# Patient Record
Sex: FEMALE | Race: WHITE | NOT HISPANIC OR LATINO
[De-identification: names, ages, dates, MRNs, and addresses within clinical notes are randomized per-mention and may not be internally consistent; named-entity substitution may affect disease eponyms.]

---

## 2017-03-27 ENCOUNTER — APPOINTMENT (OUTPATIENT)
Dept: CARDIOTHORACIC SURGERY | Facility: CLINIC | Age: 82
End: 2017-03-27

## 2017-03-27 ENCOUNTER — INPATIENT (INPATIENT)
Facility: HOSPITAL | Age: 82
LOS: 2 days | Discharge: ANOTHER HOSPITAL | DRG: 306 | End: 2017-03-30
Attending: INTERNAL MEDICINE | Admitting: INTERNAL MEDICINE
Payer: MEDICARE

## 2017-03-27 VITALS
HEIGHT: 59 IN | DIASTOLIC BLOOD PRESSURE: 68 MMHG | RESPIRATION RATE: 20 BRPM | OXYGEN SATURATION: 96 % | SYSTOLIC BLOOD PRESSURE: 108 MMHG | WEIGHT: 124.78 LBS | HEART RATE: 112 BPM

## 2017-03-27 VITALS
SYSTOLIC BLOOD PRESSURE: 139 MMHG | OXYGEN SATURATION: 98 % | TEMPERATURE: 97.3 F | HEART RATE: 113 BPM | RESPIRATION RATE: 19 BRPM | DIASTOLIC BLOOD PRESSURE: 74 MMHG

## 2017-03-27 DIAGNOSIS — I50.22 CHRONIC SYSTOLIC (CONGESTIVE) HEART FAILURE: ICD-10-CM

## 2017-03-27 DIAGNOSIS — I35.0 NONRHEUMATIC AORTIC (VALVE) STENOSIS: ICD-10-CM

## 2017-03-27 DIAGNOSIS — Z86.79 PERSONAL HISTORY OF OTHER DISEASES OF THE CIRCULATORY SYSTEM: ICD-10-CM

## 2017-03-27 DIAGNOSIS — I50.41 ACUTE COMBINED SYSTOLIC (CONGESTIVE) AND DIASTOLIC (CONGESTIVE) HEART FAILURE: ICD-10-CM

## 2017-03-27 DIAGNOSIS — Z86.39 PERSONAL HISTORY OF OTHER ENDOCRINE, NUTRITIONAL AND METABOLIC DISEASE: ICD-10-CM

## 2017-03-27 DIAGNOSIS — Z85.3 PERSONAL HISTORY OF MALIGNANT NEOPLASM OF BREAST: ICD-10-CM

## 2017-03-27 DIAGNOSIS — Z78.9 OTHER SPECIFIED HEALTH STATUS: ICD-10-CM

## 2017-03-27 DIAGNOSIS — E11.9 TYPE 2 DIABETES MELLITUS WITHOUT COMPLICATIONS: ICD-10-CM

## 2017-03-27 PROBLEM — Z00.00 ENCOUNTER FOR PREVENTIVE HEALTH EXAMINATION: Status: ACTIVE | Noted: 2017-03-27

## 2017-03-27 LAB
ALBUMIN SERPL ELPH-MCNC: 3.5 G/DL — SIGNIFICANT CHANGE UP (ref 3.4–5)
ALP SERPL-CCNC: 147 U/L — HIGH (ref 40–120)
ALT FLD-CCNC: 78 U/L — HIGH (ref 12–42)
ANION GAP SERPL CALC-SCNC: 11 MMOL/L — SIGNIFICANT CHANGE UP (ref 9–16)
APTT BLD: 29.1 SEC — SIGNIFICANT CHANGE UP (ref 27.5–37.4)
AST SERPL-CCNC: 43 U/L — HIGH (ref 15–37)
BASOPHILS NFR BLD AUTO: 0.2 % — SIGNIFICANT CHANGE UP (ref 0–2)
BILIRUB SERPL-MCNC: 0.8 MG/DL — SIGNIFICANT CHANGE UP (ref 0.2–1.2)
BLD GP AB SCN SERPL QL: POSITIVE — SIGNIFICANT CHANGE UP
BUN SERPL-MCNC: 81 MG/DL — HIGH (ref 7–23)
CALCIUM SERPL-MCNC: 9.8 MG/DL — SIGNIFICANT CHANGE UP (ref 8.5–10.5)
CHLORIDE SERPL-SCNC: 100 MMOL/L — SIGNIFICANT CHANGE UP (ref 96–108)
CO2 SERPL-SCNC: 22 MMOL/L — SIGNIFICANT CHANGE UP (ref 22–31)
CREAT SERPL-MCNC: 1.12 MG/DL — SIGNIFICANT CHANGE UP (ref 0.5–1.3)
EOSINOPHIL NFR BLD AUTO: 0.2 % — SIGNIFICANT CHANGE UP (ref 0–6)
GLUCOSE SERPL-MCNC: 218 MG/DL — HIGH (ref 70–99)
HBA1C BLD-MCNC: 7.7 % — HIGH (ref 4.8–5.6)
HCT VFR BLD CALC: 35.4 % — SIGNIFICANT CHANGE UP (ref 34.5–45)
HGB BLD-MCNC: 11.7 G/DL — SIGNIFICANT CHANGE UP (ref 11.5–15.5)
INR BLD: 1.21 — HIGH (ref 0.88–1.16)
LYMPHOCYTES # BLD AUTO: 13.9 % — SIGNIFICANT CHANGE UP (ref 13–44)
MCHC RBC-ENTMCNC: 29.5 PG — SIGNIFICANT CHANGE UP (ref 27–34)
MCHC RBC-ENTMCNC: 33.1 G/DL — SIGNIFICANT CHANGE UP (ref 32–36)
MCV RBC AUTO: 89.2 FL — SIGNIFICANT CHANGE UP (ref 80–100)
MONOCYTES NFR BLD AUTO: 8.9 % — SIGNIFICANT CHANGE UP (ref 2–14)
NEUTROPHILS NFR BLD AUTO: 76.8 % — SIGNIFICANT CHANGE UP (ref 43–77)
NT-PROBNP SERPL-SCNC: HIGH PG/ML
PLATELET # BLD AUTO: 106 K/UL — LOW (ref 150–400)
POTASSIUM SERPL-MCNC: 4.8 MMOL/L — SIGNIFICANT CHANGE UP (ref 3.5–5.3)
POTASSIUM SERPL-SCNC: 4.8 MMOL/L — SIGNIFICANT CHANGE UP (ref 3.5–5.3)
PROT SERPL-MCNC: 6.9 G/DL — SIGNIFICANT CHANGE UP (ref 6.4–8.2)
PROTHROM AB SERPL-ACNC: 13.5 SEC — HIGH (ref 9.8–12.7)
RBC # BLD: 3.97 M/UL — SIGNIFICANT CHANGE UP (ref 3.8–5.2)
RBC # FLD: 14 % — SIGNIFICANT CHANGE UP (ref 10.3–16.9)
RH IG SCN BLD-IMP: POSITIVE — SIGNIFICANT CHANGE UP
SODIUM SERPL-SCNC: 133 MMOL/L — LOW (ref 135–145)
TSH SERPL-MCNC: 1.96 UIU/ML — SIGNIFICANT CHANGE UP (ref 0.35–4.94)
WBC # BLD: 4.8 K/UL — SIGNIFICANT CHANGE UP (ref 3.8–10.5)
WBC # FLD AUTO: 4.8 K/UL — SIGNIFICANT CHANGE UP (ref 3.8–10.5)

## 2017-03-27 PROCEDURE — 93010 ELECTROCARDIOGRAM REPORT: CPT

## 2017-03-27 PROCEDURE — 86077 PHYS BLOOD BANK SERV XMATCH: CPT

## 2017-03-27 PROCEDURE — 71010: CPT | Mod: 26

## 2017-03-27 RX ORDER — DEXTROSE 50 % IN WATER 50 %
25 SYRINGE (ML) INTRAVENOUS ONCE
Qty: 0 | Refills: 0 | Status: DISCONTINUED | OUTPATIENT
Start: 2017-03-27 | End: 2017-03-30

## 2017-03-27 RX ORDER — DEXTROSE 50 % IN WATER 50 %
1 SYRINGE (ML) INTRAVENOUS ONCE
Qty: 0 | Refills: 0 | Status: DISCONTINUED | OUTPATIENT
Start: 2017-03-27 | End: 2017-03-30

## 2017-03-27 RX ORDER — SODIUM CHLORIDE 9 MG/ML
3 INJECTION INTRAMUSCULAR; INTRAVENOUS; SUBCUTANEOUS EVERY 8 HOURS
Qty: 0 | Refills: 0 | Status: DISCONTINUED | OUTPATIENT
Start: 2017-03-27 | End: 2017-03-30

## 2017-03-27 RX ORDER — GLUCAGON INJECTION, SOLUTION 0.5 MG/.1ML
1 INJECTION, SOLUTION SUBCUTANEOUS ONCE
Qty: 0 | Refills: 0 | Status: DISCONTINUED | OUTPATIENT
Start: 2017-03-27 | End: 2017-03-30

## 2017-03-27 RX ORDER — CHLORHEXIDINE GLUCONATE 213 G/1000ML
10 SOLUTION TOPICAL ONCE
Qty: 0 | Refills: 0 | Status: COMPLETED | OUTPATIENT
Start: 2017-03-27 | End: 2017-03-28

## 2017-03-27 RX ORDER — FUROSEMIDE 40 MG
20 TABLET ORAL ONCE
Qty: 0 | Refills: 0 | Status: COMPLETED | OUTPATIENT
Start: 2017-03-27 | End: 2017-03-27

## 2017-03-27 RX ORDER — SODIUM CHLORIDE 9 MG/ML
1000 INJECTION, SOLUTION INTRAVENOUS
Qty: 0 | Refills: 0 | Status: DISCONTINUED | OUTPATIENT
Start: 2017-03-27 | End: 2017-03-30

## 2017-03-27 RX ORDER — CHLORHEXIDINE GLUCONATE 213 G/1000ML
1 SOLUTION TOPICAL ONCE
Qty: 0 | Refills: 0 | Status: COMPLETED | OUTPATIENT
Start: 2017-03-28 | End: 2017-03-28

## 2017-03-27 RX ORDER — INSULIN LISPRO 100/ML
VIAL (ML) SUBCUTANEOUS
Qty: 0 | Refills: 0 | Status: DISCONTINUED | OUTPATIENT
Start: 2017-03-27 | End: 2017-03-30

## 2017-03-27 RX ORDER — DEXTROSE 50 % IN WATER 50 %
12.5 SYRINGE (ML) INTRAVENOUS ONCE
Qty: 0 | Refills: 0 | Status: DISCONTINUED | OUTPATIENT
Start: 2017-03-27 | End: 2017-03-30

## 2017-03-27 RX ADMIN — SODIUM CHLORIDE 3 MILLILITER(S): 9 INJECTION INTRAMUSCULAR; INTRAVENOUS; SUBCUTANEOUS at 21:47

## 2017-03-27 RX ADMIN — Medication 20 MILLIGRAM(S): at 21:43

## 2017-03-27 RX ADMIN — Medication 4: at 21:43

## 2017-03-27 NOTE — H&P ADULT - NSHPLABSRESULTS_GEN_ALL_CORE
***PRELIMINARY REPORT***    EXAM:  XR CHEST-FRONTAL                          PROCEDURE DATE:  03/27/2017        ***PRELIMINARY REPORT***      INTERPRETATION:  RESIDENT PRELIMINARY REPORT    XR CHEST-FRONTAL dated 3/27/2017 7:34 PM.    INDICATION: 89-year-old female with shortness of breath and congestive   heart failure.    COMPARISON: Chest x-ray 12/1/2014    FINDINGS:  Single AP view of the chest is submitted. There has been interval   placement of a left chest dual-lead permanent pacemaker. The heart is   enlarged. Slightly ectatic course of the thoracic aorta is noted. Small   right pleural effusion with adjacent basilar atelectasis. No left   effusion. No pneumothorax. Osseous structures demonstrate degenerative   changes.      IMPRESSION:  Cardiomegaly with small right pleural effusion and adjacent right basilar   atelectasis.

## 2017-03-27 NOTE — H&P ADULT - ASSESSMENT
89y F with history of HTN, DM2 (diet controlled), systolic and diastolic CHF, recently treated for PNA at OSH and discharged to home after medical therapy.  Pt returned to her cardiologist and underwent echocardiogram which showed critical AS with mean pressure gradient of 44mmHg and BONNIE 0.36cm2.  Pt was referred for further evaluation by Dr. Patel and was found to be in acute CHF exacerbation at the office visit and was subsequently admitted for medical optimization and further procedural workup for AS. 89y F with history of HTN, DM2 (diet controlled), systolic and diastolic CHF, recently treated for PNA at OSH and discharged to home after medical therapy.  Pt returned to her cardiologist and underwent echocardiogram which showed critical AS with mean pressure gradient of 44mmHg and BONNIE 0.36cm2.  Pt was referred for further evaluation by Dr. Patel and was found to be in acute CHF exacerbation at the office visit and was subsequently admitted for medical optimization and further procedural workup for AS.    acute/chronic diastolic ?systolic CHF (pending TTE for EF and AI), critical AS, NYHA IV

## 2017-03-27 NOTE — H&P ADULT - HISTORY OF PRESENT ILLNESS
Mrs. Wang is a 89y F with history of HTN, DM2 (diet controlled), Breast CA s/p lumpectomy 22 years ago, diastolic and systolic CHF who was recently admitted to [facility] for pneumonia.  Pt received antibiotic therapy and was discharged to home on day 2 of hospital stay.  Pt later was evaluated as an outpatient by her cardiologist (Dr. Hand) and underwent echocardiogram which revealed critical aortic stenosis with an BONNIE 0.36cm and mean pressure gradient of 44mmHg.  Pt was referred to [facility] for evaluation of aortic stenosis, for which she was seen as an outpatient on 3/27/17.  At the office visit, pt was found to be fluid overloaded with acute SOB and pt was admitted to [facility] under the care of Dr. Patel for CHF medical optimization and procedural evaluation for possible BAV.  At the time of exam, pt currently endorses ____ and denies ____. Mrs. Wang is a 89y F with history of HTN, DM2 (diet controlled), Breast CA s/p lumpectomy 22 years ago, diastolic and systolic CHF who was recently admitted to [facility] for pneumonia.  Pt received antibiotic therapy and was discharged to home on day 2 of hospital stay.  Pt later was evaluated as an outpatient by her cardiologist (Dr. Hand) and underwent echocardiogram which revealed critical aortic stenosis with an BONNIE 0.36cm and mean pressure gradient of 44mmHg.  Pt was referred to Dr. Patel (Structural Heart) for evaluation of aortic stenosis, for which she presented as an outpatient on 3/27/17.  At the office visit, pt was found to be fluid overloaded with acute SOB and b/l LE pitting edema and NYHA Class IV symptoms including increasing fatigue that progressed to the point where pt was non-ambulatory and required to be mobilized with a wheelchair.  Pt was admitted on 3/27/17 to [facility] under the care of Dr. Patel for CHF medical optimization and procedural evaluation for possible BAV.  At the time of exam, pt currently endorses increased fatigue x 1 week, SOB and productive cough as well as the feeling that her heart is "beating hard".  Currently denies HA, syncope, AMS, substernal CP, WAGNER, PND, wheeze, hemoptysis, n/v/d/c, fever, chills, night sweats.

## 2017-03-27 NOTE — H&P ADULT - PMH
Aortic valve disorder  Aortic stenosis, moderate  Cellulitis and abscess of foot  Cellulitis of foot, right  Osteoarthritis  OA (osteoarthritis)  Type 2 diabetes mellitus  DM (diabetes mellitus)

## 2017-03-27 NOTE — H&P ADULT - NSHPPHYSICALEXAM_GEN_ALL_CORE
CONSTITUTIONAL:                                                                          WNL  NEURO:                                                                                             WNL                      EYES:                                                                                                  WNL  ENMT:                                                                                                WNL  CV:                                                                                                      WNL  RESPIRATORY:                                                                                  WNL  GI:                                                                                                       WNL  : CROCKER + / -                                                                                 WNL  MUSKULOSKELETAL:                                                                       WNL  SKIN / BREAST:                                                                                 WNL CONSTITUTIONAL:   Appears in no acute distress, of stated age, well nourished.   NEURO: AAOx3, no focal deficits, no AMS, answers questions appropriately.                       EYES: WNL  ENMT:   WNL  CV:  Tachycardic, S1/S2, no m/r/g.   RESPIRATORY: Diffuse rales in b/l lung fields, no acute distress.  No wheeze of rhonchi.  GI: ND, soft, NBS, non-TTP.  :  WNL  MUSKULOSKELETAL: WNL  SKIN / BREAST:   WNL  EXTREMITIES: 3+ b/l LE Pitting edema.    VASCULAR: R: 1+ b/l; unable to palpate DP and PT 2/2 edema.

## 2017-03-27 NOTE — H&P ADULT - PROBLEM SELECTOR PLAN 2
-Lasix 20mg PO x 1 dose per Dr. Patel  -Nasal cannula, humidified O2 at 2L  -Monitor respiratory status via SpO2  -Oro inserted on admission, strict monitoring of I/O and daily weights.   -DASH/TLC, constant carb diet with fluid restrictions (1L)  -CXR on admission shows increased congestion and L pleural effusion.  Repeat CXR in AM  -PT to be consulted tomorrow to maintain physical condition pre and post-procedure.

## 2017-03-27 NOTE — H&P ADULT - NSHPSOCIALHISTORY_GEN_ALL_CORE
SOCIAL HISTORY:  Smoker:  YES / NO        PACK YEARS:                         WHEN QUIT?  ETOH use:  YES / NO               FREQUENCY / QUANTITY:  Ilicit Drug use:  YES / NO  Occupation:  Assisted device use (Cane / Walker):  Live with: SOCIAL HISTORY:  Smoker:  NO         ETOH use:   NO                 Ilicit Drug use: NO  Occupation: Retired  Assisted device use (Cane / Walker): Wheelchair  Live with: Alone (Daughter lives downstairs)

## 2017-03-27 NOTE — H&P ADULT - NSHPREVIEWOFSYSTEMS_GEN_ALL_CORE
Review of Systems  CONSTITUTIONAL:  Denies Fevers / chills, sweats, fatigue, weight loss, weight gain                                      NEURO:  Denies parathesias, seizures, syncope, confusion                                                                                EYES:  Denies Blurry vision, discharge, pain, loss of vision                                                                                    ENMT:  Denies Difficulty hearing, vertigo, dysphagia, epistaxis, recent dental work                                       CV:  Denies Chest pain, palpitations, WAGNER, orthopnea                                                                                          RESPIRATORY:  Denies Wheezing, SOB, cough / sputum, hemoptysis                                                                GI:  Denies Nausea, vommiting, diarrhea, constipation, melena, difficulty swallowing                                               : Denies Hematuria, dysuria, urgency, incontinence                                                                                         MUSKULOSKELETAL:  Denies arthritis, joint swelling, muscle weakness                                                             SKIN/BREAST:  Denies rash, itching, joann loss, masses                                                                                            PSYCH:  Denies depresion, anxiety, suicidal ideation                                                                                               HEME/LYMPH:  Denies bruises easily, enlarged lymph nodes, tender lymph nodes                                        ENDOCRINE:  Denies cold intolerance, heat intolerance, polydipsia Review of Systems  CONSTITUTIONAL:  +Fatigue x 1 weeks.  Denies Fevers / chills, sweats, weight loss                                      NEURO:  Denies parathesias, seizures, syncope, confusion                                                                                EYES:  Denies Blurry vision, discharge, pain, loss of vision                                                                                    ENMT:  Denies Difficulty hearing, vertigo, dysphagia, epistaxis, recent dental work                                       CV:  +Palpitations ("beating hard"), Denies Chest pain, WAGNER, orthopnea                                                                                          RESPIRATORY: +SOB and productive cough with clear sputum; Denies Wheezing, hemoptysis                                                                GI:  Denies Nausea, vommiting, diarrhea, constipation, melena, difficulty swallowing                                               : Denies Hematuria, dysuria, urgency, incontinence                                                                                         MUSKULOSKELETAL:  Denies arthritis, joint swelling, muscle weakness                                                             SKIN/BREAST:  Denies rash, itching, joann loss, masses                                                                                            PSYCH:  Denies depresion, anxiety, suicidal ideation                                                                                               HEME/LYMPH:  Denies bruises easily, enlarged lymph nodes, tender lymph nodes                                        ENDOCRINE:  Denies cold intolerance, heat intolerance, polydipsia

## 2017-03-27 NOTE — H&P ADULT - PROBLEM SELECTOR PLAN 1
-NPO at [facility] for planned BAV/Cardiac cath tomorrow.    -Echocardiogram, Carotid dopplers, PFTs ordered, pending  -CXR and EKG performed, f/u results.   -Pre-op labs: CBC, CMP, Coags, T/Sx2, UA, TFT, pro-BNP, A1C ordered, pending.   -Monitor HR, BP, Tele  -Will maintain caution with Beta Blockade 2/2 critical AS  -Medical optimization of CHF -NPO at [facility] for planned BAV/Cardiac cath tomorrow - will need TTE prior  -TTE, Carotid dopplers, PFTs ordered, pending  -CXR and EKG performed, f/u results.   -Pre-op labs: CBC, CMP, Coags, T/Sx2, UA, TFT, pro-BNP, A1C ordered, pending.   -Monitor HR, BP, Tele  -Will maintain caution with Beta Blockade 2/2 critical AS  -Medical optimization of CHF

## 2017-03-27 NOTE — H&P ADULT - PSH
Other postprocedural status  S/P appendectomy  Other postprocedural status  S/P lumpectomy of breast  Other postprocedural status  calcified granulma

## 2017-03-28 ENCOUNTER — APPOINTMENT (OUTPATIENT)
Dept: CARDIOTHORACIC SURGERY | Facility: HOSPITAL | Age: 82
End: 2017-03-28

## 2017-03-28 PROBLEM — Z78.9 NON-SMOKER: Status: ACTIVE | Noted: 2017-03-28

## 2017-03-28 LAB
ALBUMIN SERPL ELPH-MCNC: 3 G/DL — LOW (ref 3.4–5)
ALP SERPL-CCNC: 119 U/L — SIGNIFICANT CHANGE UP (ref 40–120)
ALT FLD-CCNC: 66 U/L — HIGH (ref 12–42)
ANION GAP SERPL CALC-SCNC: 10 MMOL/L — SIGNIFICANT CHANGE UP (ref 9–16)
APPEARANCE UR: CLEAR — SIGNIFICANT CHANGE UP
AST SERPL-CCNC: 38 U/L — HIGH (ref 15–37)
BACTERIA # UR AUTO: PRESENT /HPF
BASOPHILS NFR BLD AUTO: 0.4 % — SIGNIFICANT CHANGE UP (ref 0–2)
BILIRUB SERPL-MCNC: 0.8 MG/DL — SIGNIFICANT CHANGE UP (ref 0.2–1.2)
BILIRUB UR-MCNC: NEGATIVE — SIGNIFICANT CHANGE UP
BUN SERPL-MCNC: 85 MG/DL — HIGH (ref 7–23)
CALCIUM SERPL-MCNC: 9.6 MG/DL — SIGNIFICANT CHANGE UP (ref 8.5–10.5)
CHLORIDE SERPL-SCNC: 105 MMOL/L — SIGNIFICANT CHANGE UP (ref 96–108)
CO2 SERPL-SCNC: 23 MMOL/L — SIGNIFICANT CHANGE UP (ref 22–31)
COLOR SPEC: YELLOW — SIGNIFICANT CHANGE UP
CREAT SERPL-MCNC: 1.09 MG/DL — SIGNIFICANT CHANGE UP (ref 0.5–1.3)
DIFF PNL FLD: (no result)
EOSINOPHIL NFR BLD AUTO: 0.6 % — SIGNIFICANT CHANGE UP (ref 0–6)
EPI CELLS # UR: SIGNIFICANT CHANGE UP /HPF
GLUCOSE SERPL-MCNC: 130 MG/DL — HIGH (ref 70–99)
GLUCOSE UR QL: NEGATIVE — SIGNIFICANT CHANGE UP
HCT VFR BLD CALC: 32.5 % — LOW (ref 34.5–45)
HGB BLD-MCNC: 10.6 G/DL — LOW (ref 11.5–15.5)
KETONES UR-MCNC: (no result) MG/DL
LEUKOCYTE ESTERASE UR-ACNC: (no result)
LYMPHOCYTES # BLD AUTO: 20.9 % — SIGNIFICANT CHANGE UP (ref 13–44)
MCHC RBC-ENTMCNC: 29 PG — SIGNIFICANT CHANGE UP (ref 27–34)
MCHC RBC-ENTMCNC: 32.6 G/DL — SIGNIFICANT CHANGE UP (ref 32–36)
MCV RBC AUTO: 89 FL — SIGNIFICANT CHANGE UP (ref 80–100)
MONOCYTES NFR BLD AUTO: 11.5 % — SIGNIFICANT CHANGE UP (ref 2–14)
NEUTROPHILS NFR BLD AUTO: 66.6 % — SIGNIFICANT CHANGE UP (ref 43–77)
NITRITE UR-MCNC: NEGATIVE — SIGNIFICANT CHANGE UP
PH UR: 5 — SIGNIFICANT CHANGE UP (ref 4–8)
PLATELET # BLD AUTO: 111 K/UL — LOW (ref 150–400)
POTASSIUM SERPL-MCNC: 4.5 MMOL/L — SIGNIFICANT CHANGE UP (ref 3.5–5.3)
POTASSIUM SERPL-SCNC: 4.5 MMOL/L — SIGNIFICANT CHANGE UP (ref 3.5–5.3)
PROT SERPL-MCNC: 6 G/DL — LOW (ref 6.4–8.2)
PROT UR-MCNC: 30 MG/DL
RBC # BLD: 3.65 M/UL — LOW (ref 3.8–5.2)
RBC # FLD: 14.2 % — SIGNIFICANT CHANGE UP (ref 10.3–16.9)
RBC CASTS # UR COMP ASSIST: > 10 /HPF
RH IG SCN BLD-IMP: POSITIVE — SIGNIFICANT CHANGE UP
SODIUM SERPL-SCNC: 138 MMOL/L — SIGNIFICANT CHANGE UP (ref 135–145)
SP GR SPEC: 1.02 — SIGNIFICANT CHANGE UP (ref 1–1.03)
UROBILINOGEN FLD QL: 0.2 E.U./DL — SIGNIFICANT CHANGE UP
WBC # BLD: 4.9 K/UL — SIGNIFICANT CHANGE UP (ref 3.8–10.5)
WBC # FLD AUTO: 4.9 K/UL — SIGNIFICANT CHANGE UP (ref 3.8–10.5)
WBC UR QL: (no result) /HPF

## 2017-03-28 PROCEDURE — 99223 1ST HOSP IP/OBS HIGH 75: CPT

## 2017-03-28 PROCEDURE — 93306 TTE W/DOPPLER COMPLETE: CPT | Mod: 26

## 2017-03-28 PROCEDURE — 71010: CPT | Mod: 26

## 2017-03-28 PROCEDURE — 93880 EXTRACRANIAL BILAT STUDY: CPT | Mod: 26

## 2017-03-28 PROCEDURE — 99222 1ST HOSP IP/OBS MODERATE 55: CPT

## 2017-03-28 PROCEDURE — 94010 BREATHING CAPACITY TEST: CPT | Mod: 26

## 2017-03-28 PROCEDURE — 99233 SBSQ HOSP IP/OBS HIGH 50: CPT

## 2017-03-28 RX ORDER — PANTOPRAZOLE SODIUM 20 MG/1
40 TABLET, DELAYED RELEASE ORAL
Qty: 0 | Refills: 0 | Status: DISCONTINUED | OUTPATIENT
Start: 2017-03-28 | End: 2017-03-30

## 2017-03-28 RX ORDER — HEPARIN SODIUM 5000 [USP'U]/ML
5000 INJECTION INTRAVENOUS; SUBCUTANEOUS EVERY 8 HOURS
Qty: 0 | Refills: 0 | Status: DISCONTINUED | OUTPATIENT
Start: 2017-03-28 | End: 2017-03-30

## 2017-03-28 RX ADMIN — CHLORHEXIDINE GLUCONATE 1 APPLICATION(S): 213 SOLUTION TOPICAL at 02:27

## 2017-03-28 RX ADMIN — SODIUM CHLORIDE 3 MILLILITER(S): 9 INJECTION INTRAMUSCULAR; INTRAVENOUS; SUBCUTANEOUS at 21:15

## 2017-03-28 RX ADMIN — HEPARIN SODIUM 5000 UNIT(S): 5000 INJECTION INTRAVENOUS; SUBCUTANEOUS at 14:24

## 2017-03-28 RX ADMIN — CHLORHEXIDINE GLUCONATE 10 MILLILITER(S): 213 SOLUTION TOPICAL at 06:27

## 2017-03-28 RX ADMIN — Medication: at 18:23

## 2017-03-28 RX ADMIN — SODIUM CHLORIDE 3 MILLILITER(S): 9 INJECTION INTRAMUSCULAR; INTRAVENOUS; SUBCUTANEOUS at 14:25

## 2017-03-28 RX ADMIN — SODIUM CHLORIDE 3 MILLILITER(S): 9 INJECTION INTRAMUSCULAR; INTRAVENOUS; SUBCUTANEOUS at 06:27

## 2017-03-28 RX ADMIN — HEPARIN SODIUM 5000 UNIT(S): 5000 INJECTION INTRAVENOUS; SUBCUTANEOUS at 23:42

## 2017-03-28 NOTE — PROGRESS NOTE ADULT - SUBJECTIVE AND OBJECTIVE BOX
EPS Device interrogation    Indication:     Device model: 	[employer]    Functioning Mode: DDD			    Underlying Rhythm: A. Fib /    Pacemaker dependency:  Yes    Battery status: 4 years  Interrogating parameters:   				RA			RV			LV    Sense:                                        0.7 mV                          paved    Threshold:                                       not done A.Fib               0.6 V@0.5 ms    Pacing Impedance:                              495om                           352 om    Shock Impedance:                                                                                                                  Events/Alert:  episodes of pA.FIb    Parameter change: max trac rate decreased to 110 bpm      Normal function PPM      [ ]EPS attending: Interrogation reviewed. Agree with above.

## 2017-03-28 NOTE — PROGRESS NOTE ADULT - SUBJECTIVE AND OBJECTIVE BOX
Patient discussed on morning rounds with Dr. Patel    Operation / Date: Admitted for medical optimization for acute CHF exacerbation and critical AS on 3/27/17    SUBJECTIVE ASSESSMENT:  89y Female seen at bedside this AM.  Pt doing well and feels much better after starting         Vital Signs Last 24 Hrs  T(C): 36.7, Max: 37.1 ( @ 18:20)  T(F): 98, Max: 98.7 ( @ 18:20)  HR: 112 (112 - 114)  BP: 90/62 (90/62 - 116/75)  BP(mean): 80 (80 - 82)  RR: 18 (18 - 20)  SpO2: 95% (95% - 98%)  I&O's Detail  I & Os for 24h ending 28 Mar 2017 07:00  =============================================  IN:    Oral Fluid: 440 ml    Total IN: 440 ml  ---------------------------------------------  OUT:    Voided: 750 ml    Ureteral Catheter: 70 ml    Total OUT: 820 ml  ---------------------------------------------  Total NET: -380 ml    I & Os for current day (as of 28 Mar 2017 13:13)  =============================================  IN:    Total IN: 0 ml  ---------------------------------------------  OUT:    Ureteral Catheter: 125 ml    Total OUT: 125 ml  ---------------------------------------------  Total NET: -125 ml      CHEST TUBE:  Yes/No. AIR LEAKS: Yes/No. Suction / H2O SEAL.   MIKY DRAIN:  Yes/No.  EPICARDIAL WIRES: Yes/No.  TIE DOWNS: Yes/No.  CROCKER: Yes/No.    PHYSICAL EXAM:    General:     Neurological:    Cardiovascular:    Respiratory:    Gastrointestinal:    Extremities:    Vascular:    Incision Sites:    LABS:                        10.6   4.9   )-----------( 111      ( 28 Mar 2017 06:13 )             32.5       COUMADIN:  Yes/No. REASON: .    PT/INR - ( 27 Mar 2017 20:02 )   PT: 13.5 sec;   INR: 1.21          PTT - ( 27 Mar 2017 20:02 )  PTT:29.1 sec    28 Mar 2017 06:12    138    |  105    |  85     ----------------------------<  130    4.5     |  23     |  1.09     Ca    9.6        28 Mar 2017 06:12    TPro  6.0    /  Alb  3.0    /  TBili  0.8    /  DBili  x      /  AST  38     /  ALT  66     /  AlkPhos  119    28 Mar 2017 06:12      Urinalysis Basic - ( 28 Mar 2017 10:14 )    Color: Yellow / Appearance: Clear / S.020 / pH: x  Gluc: x / Ketone: Trace mg/dL  / Bili: NEGATIVE / Urobili: 0.2 E.U./dL   Blood: x / Protein: 30 mg/dL / Nitrite: NEGATIVE   Leuk Esterase: Trace / RBC: > 10 /HPF / WBC 5-10 /HPF   Sq Epi: x / Non Sq Epi: Few /HPF / Bacteria: Present /HPF        MEDICATIONS  (STANDING):  sodium chloride 0.9% lock flush 3milliLiter(s) IV Push every 8 hours  insulin lispro (HumaLOG) corrective regimen sliding scale  SubCutaneous Before meals and at bedtime  dextrose 5%. 1000milliLiter(s) IV Continuous <Continuous>  dextrose 50% Injectable 12.5Gram(s) IV Push once  dextrose 50% Injectable 25Gram(s) IV Push once  dextrose 50% Injectable 25Gram(s) IV Push once    MEDICATIONS  (PRN):  dextrose Gel 1Dose(s) Oral once PRN Blood Glucose LESS THAN 70 milliGRAM(s)/deciliter  glucagon  Injectable 1milliGRAM(s) IntraMuscular once PRN Glucose LESS THAN 70 milligrams/deciliter        RADIOLOGY & ADDITIONAL TESTS: Patient discussed on morning rounds with Dr. Patel    Operation / Date: Admitted for medical optimization for acute CHF exacerbation and critical AS on 3/27/17    SUBJECTIVE ASSESSMENT:  89y Female seen at bedside this AM.  Pt doing well and feels much better after starting nasal cannula.  Otherwise, no acute complaints and no acute events overnight.  Currently denies HA, AMS, CP, palpitations, SOB, n/v/d, fever.     Vital Signs Last 24 Hrs  T(C): 36.7, Max: 37.1 ( @ 18:20)  T(F): 98, Max: 98.7 ( @ 18:20)  HR: 112 (112 - 114)  BP: 90/62 (90/62 - 116/75)  BP(mean): 80 (80 - 82)  RR: 18 (18 - 20)  SpO2: 95% (95% - 98%)  I&O's Detail  I & Os for 24h ending 28 Mar 2017 07:00  =============================================  IN:    Oral Fluid: 440 ml    Total IN: 440 ml  ---------------------------------------------  OUT:    Voided: 750 ml    Ureteral Catheter: 70 ml    Total OUT: 820 ml  ---------------------------------------------  Total NET: -380 ml    I & Os for current day (as of 28 Mar 2017 13:13)  =============================================  IN:    Total IN: 0 ml  ---------------------------------------------  OUT:    Ureteral Catheter: 125 ml    Total OUT: 125 ml  ---------------------------------------------  Total NET: -125 ml      CHEST TUBE:  No.   MIKY DRAIN:  No.  EPICARDIAL WIRES: No.  TIE DOWNS: No.  CROCKER: Yes.    PHYSICAL EXAM:    General: Resting comfortably in bed, no acute distress.     Neurological: AAOx3, no AMS or focal deficits.     Cardiovascular: +II/VI DAXA at RUSB, S1/S2, tachycardic.     Respiratory: +Diffuse rales, no acute distress, no wheeze or rhonchi    Gastrointestinal: ND, NBS, non-TTP.    Extremities: 3+ pitting edema in b/l LE    Vascular: Pulses 2+ in b/l R; unable to palpate DP, PT 2/2 edema.     Incision Sites: NA    LABS:                        10.6   4.9   )-----------( 111      ( 28 Mar 2017 06:13 )             32.5       COUMADIN:  No    PT/INR - ( 27 Mar 2017 20:02 )   PT: 13.5 sec;   INR: 1.21          PTT - ( 27 Mar 2017 20:02 )  PTT:29.1 sec    28 Mar 2017 06:12    138    |  105    |  85     ----------------------------<  130    4.5     |  23     |  1.09     Ca    9.6        28 Mar 2017 06:12    TPro  6.0    /  Alb  3.0    /  TBili  0.8    /  DBili  x      /  AST  38     /  ALT  66     /  AlkPhos  119    28 Mar 2017 06:12      Urinalysis Basic - ( 28 Mar 2017 10:14 )    Color: Yellow / Appearance: Clear / S.020 / pH: x  Gluc: x / Ketone: Trace mg/dL  / Bili: NEGATIVE / Urobili: 0.2 E.U./dL   Blood: x / Protein: 30 mg/dL / Nitrite: NEGATIVE   Leuk Esterase: Trace / RBC: > 10 /HPF / WBC 5-10 /HPF   Sq Epi: x / Non Sq Epi: Few /HPF / Bacteria: Present /HPF        MEDICATIONS  (STANDING):  sodium chloride 0.9% lock flush 3milliLiter(s) IV Push every 8 hours  insulin lispro (HumaLOG) corrective regimen sliding scale  SubCutaneous Before meals and at bedtime  dextrose 5%. 1000milliLiter(s) IV Continuous <Continuous>  dextrose 50% Injectable 12.5Gram(s) IV Push once  dextrose 50% Injectable 25Gram(s) IV Push once  dextrose 50% Injectable 25Gram(s) IV Push once    MEDICATIONS  (PRN):  dextrose Gel 1Dose(s) Oral once PRN Blood Glucose LESS THAN 70 milliGRAM(s)/deciliter  glucagon  Injectable 1milliGRAM(s) IntraMuscular once PRN Glucose LESS THAN 70 milligrams/deciliter        RADIOLOGY & ADDITIONAL TESTS:  EXAM:  XR CHEST 1 VIEW PORT ROUTINE                          PROCEDURE DATE:  2017          INTERPRETATION:  XR CHEST 1 VIEW PORT ROUTINE DATED 3/28/2017 6:49 AM    INDICATION: 89 years-old Female with sob.    PRIOR STUDIES: Chestx-ray from 3/27/2017    FINDINGS: AP view of the chest again demonstrates cardiomegaly.   Persistent small right pleural effusion effusion and right basilar   infiltrate. Left lung is grossly clear. No pneumothorax. No other change.    IMPRESSION:  Persistent small right pleural effusion and right basilar infiltrate.

## 2017-03-28 NOTE — CHART NOTE - NSCHARTNOTEFT_GEN_A_CORE
Palliative Care Evaluation Request:    Pt is a 89y/o F who was admitted to [facility] on 3/27/17 with acute systolic/diastolic CHF exacerbation and severe Aortic Stenosis with BONNIE of 0.5cm2.  Pt is not a surgical or procedural candidate at this time 2/2 EF 25% and prognosis is less than 6 months.

## 2017-03-28 NOTE — CONSULT NOTE ADULT - ASSESSMENT
JOHN is a 89 y/o F with h/o HTN, DM (diet controlled), AS, OA, foot cellulitis, breast ca with lumpectomy, appendectomy, diastolic and systolic CHF, recently Rx for pna at outside hospital then f/u with cardiologist who did an echo which revealed critical AS, subsequently referred to Dr. Patel for possible BAV as outpt, then required hospital admit for further SOB/cardiac management. Workup done so far has deemed pt not to be an appropriate candidate for BAV and family wants pt to be treated at home.  Pt seen to assist with d/c planning     -various dispo options including traditional home care, home hospice, and [facility] discussed with pt, daughter, and son, face to face.  Consensus is for home hospice.  Referral made to unit .  Pt will need oxygen delivered home prior to d/c.  Family is also requesting a hospital bed.  Code status can be addressed with hospice .  Case d/w CTS STEVEN Ramos.  Consider morphine 2mg po q6h for SOB

## 2017-03-28 NOTE — CONSULT NOTE ADULT - SUBJECTIVE AND OBJECTIVE BOX
HILTON HUDSON   MRN-[id number]     :1917    HPI:  Mrs. Hudson is a 89y F with history of HTN, DM2 (diet controlled), Breast CA s/p lumpectomy 22 years ago, diastolic and systolic CHF who was recently admitted to [facility] for pneumonia.  Pt received antibiotic therapy and was discharged to home on day 2 of hospital stay.  Pt later was evaluated as an outpatient by her cardiologist (Dr. Hand) and underwent echocardiogram which revealed critical aortic stenosis with an BONNIE 0.36cm and mean pressure gradient of 44mmHg.  Pt was referred to Dr. Patel (Structural Heart) for evaluation of aortic stenosis, for which she presented as an outpatient on 3/27/17.  At the office visit, pt was found to be fluid overloaded with acute SOB and b/l LE pitting edema and NYHA Class IV symptoms including increasing fatigue that progressed to the point where pt was non-ambulatory and required to be mobilized with a wheelchair.  Pt was admitted on 3/27/17 to [facility] under the care of Dr. Patel for CHF medical optimization and procedural evaluation for possible BAV.  At the time of exam, pt currently endorses increased fatigue x 1 week, SOB and productive cough as well as the feeling that her heart is "beating hard".  Currently denies HA, syncope, AMS, substernal CP, WAGNER, PND, wheeze, hemoptysis, n/v/d/c, fever, chills, night sweats. (27 Mar 2017 18:58) Pt s/p Echo today, found to have an EF       PAST MEDICAL & SURGICAL HISTORY:  Type 2 diabetes mellitus: DM (diabetes mellitus)  Aortic valve disorder: Aortic stenosis, moderate  Osteoarthritis: OA (osteoarthritis)  Cellulitis and abscess of foot: Cellulitis of foot, right  Other postprocedural status: calcified granulma  Other postprocedural status: S/P lumpectomy of breast  Other postprocedural status: S/P appendectomy    FAMILY HISTORY:  No pertinent family history in first degree relatives  Family history unknown: Family history unknown    SOC HX: lives above daughter, , no home care, Judaism    ROS:    Unable to attain due to:                      DYSPNEA (Anthony 0-10): 2-3                       N/V (Y/N): n                             SECRETIONS (Y/N): n               AGITATION(Y/N):n   PAIN (Y/N):  n        -Provocation/Palliation:     -Quality/Quantity:     -Radiating:     -Severity:     -Timing/Frequency:     -Impact on ADLs:    GENERAL: SOB is main complaint  HEENT: mild [place]  NECK: denies  CVS: +PPM  RESP: +SOB with mild exertion  GI: denies  : urinary catheter  MS: ambulates at home with walker    NEURO: denies  PSYCH: denies  SKIN: denies  LYMPH: denies    ALLERGIES:  Allergies    No Known Allergies    Intolerances    OPIATE NAIVE (Y/N): y  -ISTOP REVIEWED(Y/N): y,Reference #: [id number]     MEDICATIONS:      MEDICATIONS  (STANDING):  sodium chloride 0.9% lock flush 3milliLiter(s) IV Push every 8 hours  insulin lispro (HumaLOG) corrective regimen sliding scale  SubCutaneous Before meals and at bedtime  dextrose 5%. 1000milliLiter(s) IV Continuous <Continuous>  dextrose 50% Injectable 12.5Gram(s) IV Push once  dextrose 50% Injectable 25Gram(s) IV Push once  dextrose 50% Injectable 25Gram(s) IV Push once  pantoprazole    Tablet 40milliGRAM(s) Oral before breakfast  heparin  Injectable 5000Unit(s) SubCutaneous every 8 hours    MEDICATIONS  (PRN):  dextrose Gel 1Dose(s) Oral once PRN Blood Glucose LESS THAN 70 milliGRAM(s)/deciliter  glucagon  Injectable 1milliGRAM(s) IntraMuscular once PRN Glucose LESS THAN 70 milligrams/deciliter    PHYSICAL EXAM:  Vital Signs Last 24 Hrs  T(C): 36.8, Max: 37.1 ( @ 18:20)  T(F): 98.2, Max: 98.7 ( @ 18:20)  HR: 112 (112 - 114)  BP: 90/62 (90/62 - 116/75)  BP(mean): 80 (80 - 82)  RR: 18 (18 - 20)  SpO2: 95% (95% - 98%)  Daily Height in cm: 149.86 (27 Mar 2017 18:06)    Daily   CAPILLARY BLOOD GLUCOSE  133 (28 Mar 2017 11:43)    I&O's Summary  I & Os for 24h ending 28 Mar 2017 07:00  =============================================  IN: 440 ml / OUT: 820 ml / NET: -380 ml    I & Os for current day (as of 28 Mar 2017 14:00)  =============================================  IN: 0 ml / OUT: 125 ml / NET: -125 ml    GENERAL: Pleasant elderly woman appearing fairly comfortable at rest,  HEENT: normal  NECK: no palp masses  CVS: left PPM, vpaced  RESP: 2LNC, mildly labored with speech  GI:  soft, NT, ND  : f/c, clear yellow output    MS:  bilateral ankle and pedal 3+ edema  NEURO: no apparent focal deficits  PSYCH: normal, calm  SKIN: no open lesions/sores  LYMPH: no cervical LAD  KARNOFSKY: PRE-ADMIT=  50%               CURRENT=30  %  CACHEXIA (Y/N):n  BMI:34.3    LABS:    CBC:                        10.6   4.9   )-----------( 111      ( 28 Mar 2017 06:13 )             32.5     CMP:    28 Mar 2017 06:12    138    |  105    |  85     ----------------------------<  130    4.5     |  23     |  1.09     Ca    9.6        28 Mar 2017 06:12    TPro  6.0    /  Alb  3.0    /  TBili  0.8    /  DBili  x      /  AST  38     /  ALT  66     /  AlkPhos  119    28 Mar 2017 06:12    PT/INR - ( 27 Mar 2017 20:02 )   PT: 13.5 sec;   INR: 1.21          PTT - ( 27 Mar 2017 20:02 )  PTT:29.1 sec  LIVER FUNCTIONS - ( 28 Mar 2017 06:12 )  Alb: 3.0 g/dL / Pro: 6.0 g/dL / ALK PHOS: 119 U/L / ALT: 66 U/L / AST: 38 U/L / GGT: x           Urinalysis Basic - ( 28 Mar 2017 10:14 )    Color: Yellow / Appearance: Clear / S.020 / pH: x  Gluc: x / Ketone: Trace mg/dL  / Bili: NEGATIVE / Urobili: 0.2 E.U./dL   Blood: x / Protein: 30 mg/dL / Nitrite: NEGATIVE   Leuk Esterase: Trace / RBC: > 10 /HPF / WBC 5-10 /HPF   Sq Epi: x / Non Sq Epi: Few /HPF / Bacteria: Present /HPF    IMAGING:  Reviewed  EXAM:  ECHOCARDIOGRAM (CARDIOL)                          PROCEDURE DATE:  2017A small pericardial effusion noted, trace pulmonic regurgitation,mild to moderate tricuspid regurgitation,Mitral regurgitation present, at least moderate in severity.,Aortic stenosis present; probably low flow low gradient severe AS.,There is a pacemaker wire in the right heart.left atrium is severely dilated.,left ventricular ejection fraction is estimatedto be 20-25%,Left ventricular hypertrophy present    EXAM:  XR CHEST 1 VIEW PORT ROUTINE                          PROCEDURE DATE:  2017  IMPRESSION:  Persistent small right pleural effusion and right basilar infiltrate.    ADVANCED DIRECTIVES:     Full Code      DECISION MAKER: pt, but makes decisions jointly with children  LEGAL SURROGATE:    GOALS OF CARE DISCUSSION       Palliative care info/counseling provided	           Family meeting       Documentation of GOC: discussed home hospice vs traditional home care vs [facility] with pt, daughter and son.  Plan agreed upon is for home hospice via [facility].  Family is requesting hospital bed. 	          REFERRALS	        Unit SW/Case Mgmt       Hospice       PT/OT

## 2017-03-28 NOTE — PROGRESS NOTE ADULT - ASSESSMENT
89y F with history of HTN, DM2 (diet controlled), systolic and diastolic CHF, recently treated for PNA at OSH and discharged to home after medical therapy.  Pt returned to her cardiologist and underwent echocardiogram which showed critical AS with mean pressure gradient of 44mmHg and BONNIE 0.36cm2.  Admitted on 3/27/17 for further evaluation.  Stable on 3/28/17    1. Neuro: No delirium  - No acute issues at this time    2. Respiratory: Sat 98% on 2L NC  -Admitted for acute CHF exacerbation  -CXR in AM  -IS and ambulation  -Monitor SpO2 for respiratory status    3. CVS: HD Stable  -Monitor HR/BP/Tele    4. GI: Stable  -PPX:   -    5. /Renal: Cr: ___ ; No urinary issues  -Trend AM Cr  -Monitor I/O    6. ID: Afebrile, Asymptomatic  -No acute issues at this time, continue to monitor for SIRS    7. Endo: A1C: ___  -    8. Heme: H/H Stable  -DVT PPX: HSQ 5000/7500 q8h / q12h  -CBC, chem in AM    Dispo: 89y F with history of HTN, DM2 (diet controlled), systolic and diastolic CHF, recently treated for PNA at OSH and discharged to home after medical therapy.  Pt returned to her cardiologist and underwent echocardiogram which showed critical AS with mean pressure gradient of 44mmHg and BONNIE 0.36cm2.  Admitted on 3/27/17 for further evaluation.  Stable on 3/28/17    1. Neuro: No delirium  - No acute issues at this time    2. Respiratory: Sat 98% on 2L NC  -Admitted for acute CHF exacerbation  -CXR in AM  -IS and ambulation  -Monitor SpO2 for respiratory status    3. CVS: Tachycardic, HD stable with borderline hypotension; Critical AS with BONNIE 0.36cm2  -Echocardiogram this AM revealed BONNIE of 0.5cm2, EF 20-25%, and moderate to severe MR.  Per Dr. Ptael, BAV cancelled 2/2 increased risk vs. benefit.    -Palliative care consulted for discharge planning, f/u recs.   -Gentle diuresis with intermittent doses of 20mg PO  -Monitor HR/BP/Tele    4. GI: Stable  -PPX: Start Protonix  -C/w PO Kosher diet.    5. /Renal: Cr: 1.12 ; No urinary issues  -Trend AM Cr  -Monitor I/O    6. ID: Afebrile, Asymptomatic  -No acute issues at this time, continue to monitor for SIRS    7. Endo: A1C:7.7, hx of DM2, diet controlled  -ISS for glycemic control, well controlled this AM  -Trend FS.     8. Heme: H/H Stable  -DVT PPX: HSQ 5000 q8h  -CBC, chem in AM    Dispo: Palliative care to set up hospice, home when arranged. 89y F with history of HTN, DM2 (diet controlled), acute/chronic systolic and diastolic CHF, recently treated for PNA at OSH and discharged to home after medical therapy.  Pt returned to her cardiologist and underwent echocardiogram which showed critical AS with mean pressure gradient of 44mmHg and BONNIE 0.36cm2.  Admitted on 3/27/17 for further evaluation.  Stable on 3/28/17    1. Neuro: No delirium  - No acute issues at this time    2. Respiratory: Sat 98% on 2L NC  -Admitted for acute CHF exacerbation  -CXR in AM  -IS and ambulation  -Monitor SpO2 for respiratory status    3. CVS: Tachycardic, HD stable with borderline hypotension; Critical AS with BONNIE 0.36cm2  -Echocardiogram this AM revealed BONNIE of 0.5cm2, EF 20-25%, and moderate to severe MR.  Per Dr. Patel, BAV cancelled 2/2 risk > benefit in setting of severely calcified AV with low gradient, severely reduced EF, moderate-severe MR  -Palliative care consulted for discharge planning, f/u recs.   -Gentle diuresis with intermittent doses of 20mg PO  -Monitor HR/BP/Tele    4. GI: Stable  -PPX: Start Protonix  -C/w PO Kosher diet.    5. /Renal: Cr: 1.12 ; No urinary issues  -Trend AM Cr  -Monitor I/O    6. ID: Afebrile, Asymptomatic  -No acute issues at this time, continue to monitor for SIRS    7. Endo: A1C:7.7, hx of DM2, diet controlled  -ISS for glycemic control, well controlled this AM  -Trend FS.     8. Heme: H/H Stable  -DVT PPX: HSQ 5000 q8h  -CBC, chem in AM    Dispo: Palliative care to set up hospice, home when arranged.

## 2017-03-29 ENCOUNTER — TRANSCRIPTION ENCOUNTER (OUTPATIENT)
Age: 82
End: 2017-03-29

## 2017-03-29 PROCEDURE — 99233 SBSQ HOSP IP/OBS HIGH 50: CPT

## 2017-03-29 RX ADMIN — SODIUM CHLORIDE 3 MILLILITER(S): 9 INJECTION INTRAMUSCULAR; INTRAVENOUS; SUBCUTANEOUS at 07:05

## 2017-03-29 RX ADMIN — SODIUM CHLORIDE 3 MILLILITER(S): 9 INJECTION INTRAMUSCULAR; INTRAVENOUS; SUBCUTANEOUS at 22:37

## 2017-03-29 RX ADMIN — PANTOPRAZOLE SODIUM 40 MILLIGRAM(S): 20 TABLET, DELAYED RELEASE ORAL at 07:05

## 2017-03-29 RX ADMIN — SODIUM CHLORIDE 3 MILLILITER(S): 9 INJECTION INTRAMUSCULAR; INTRAVENOUS; SUBCUTANEOUS at 14:26

## 2017-03-29 RX ADMIN — Medication: at 12:10

## 2017-03-29 RX ADMIN — HEPARIN SODIUM 5000 UNIT(S): 5000 INJECTION INTRAVENOUS; SUBCUTANEOUS at 14:24

## 2017-03-29 RX ADMIN — HEPARIN SODIUM 5000 UNIT(S): 5000 INJECTION INTRAVENOUS; SUBCUTANEOUS at 22:28

## 2017-03-29 NOTE — DISCHARGE NOTE ADULT - CARE PLAN
Principal Discharge DX:	Aortic stenosis  Goal:	Recover from Hospital Stay  Instructions for follow-up, activity and diet:	-Please follow up with  ___on ___.  The office is located at [facility], 4th floor. Call us with any questions  #[phone].    -Walk daily as tolerated and use your incentive spirometer every hour.    -No driving or strenuous activity/exercise for 6 weeks, or until  cleared by your surgeon.    -Gently clean your incisions with anti-bacterial soap and water, pat  dry.  You may leave them open to air.    -Call your doctor if you have shortness of breath, chest pain not  relieved by pain medication, dizziness, fever >101.5, or increased  redness or drainage from incisions. Principal Discharge DX:	Aortic stenosis  Goal:	Recover from Hospital Stay  Instructions for follow-up, activity and diet:	-Please follow up with  ___on ___.  The office is located at [facility], [facility], 4th floor. Call us with any questions  #[phone].    -Walk daily as tolerated and use your incentive spirometer every hour.    -No driving or strenuous activity/exercise for 6 weeks, or until  cleared by your surgeon.    -Gently clean your incisions with anti-bacterial soap and water, pat  dry.  You may leave them open to air.    -Call your doctor if you have shortness of breath, chest pain not  relieved by pain medication, dizziness, fever >101.5, or increased  redness or drainage from incisions. Principal Discharge DX:	Aortic stenosis  Goal:	Recover from Hospital Stay  Instructions for follow-up, activity and diet:	Patient is being transferred to [facility] for further treatment. Principal Discharge DX:	Aortic stenosis  Goal:	Recover from Hospital Stay  Instructions for follow-up, activity and diet:	Patient is being transferred to [facility] for further treatment.

## 2017-03-29 NOTE — PROGRESS NOTE ADULT - SUBJECTIVE AND OBJECTIVE BOX
Patient discussed on morning rounds with Dr. Patel    Operation / Date: Admitted for medical optimization for acute CHF exacerbation and critical AS on 3/27/17    SUBJECTIVE ASSESSMENT:  89y Female seen at bedside this AM.  Pt feels lethargic and generally in poor spirits after being informed she was not a procedural candidate. Additionally, she states that she feels somewhat more short of breath compared to yesterday.  No other acute complaints and no acute overnight events.  Denies HA, AMS, CP, palpitations, cough, wheeze, n/v/d, fever.         Vital Signs Last 24 Hrs  T(C): 36.4, Max: 37.1 (- @ 22:08)  T(F): 97.6, Max: 98.8 (- @ 22:08)  HR: 70 (60 - 72)  BP: 132/89 (88/63 - 139/77)  BP(mean): 105 (72 - 105)  RR: 18 (18 - 22)  SpO2: 97% (93% - 98%)  I&O's Detail    I & Os for current day (as of 29 Mar 2017 20:56)  =============================================  IN:    Oral Fluid: 370 ml    Total IN: 370 ml  ---------------------------------------------  OUT:    Ureteral Catheter: 345 ml    Total OUT: 345 ml  ---------------------------------------------  Total NET: 25 ml      CHEST TUBE:  No.   MIKY DRAIN:  No.  EPICARDIAL WIRES: No.  TIE DOWNS: No.  CROCKER: Yes.    PHYSICAL EXAM:    General: Resting comfortably in bed, no acute distress.     Neurological: AAOx3, no AMS or focal deficits.     Cardiovascular: +II/VI DAXA at RUSB, S1/S2, tachycardic.     Respiratory: +Diffuse rales, no acute distress, no wheeze or rhonchi    Gastrointestinal: ND, NBS, non-TTP.    Extremities: 3+ pitting edema in b/l LE    Vascular: Pulses 2+ in b/l R; unable to palpate DP, PT 2/2 edema.     Incision Sites: NA    LABS:                        10.6   4.9   )-----------( 111      ( 28 Mar 2017 06:13 )             32.5       COUMADIN:  No.     28 Mar 2017 06:12    138    |  105    |  85     ----------------------------<  130    4.5     |  23     |  1.09     Ca    9.6        28 Mar 2017 06:12    TPro  6.0    /  Alb  3.0    /  TBili  0.8    /  DBili  x      /  AST  38     /  ALT  66     /  AlkPhos  119    28 Mar 2017 06:12      Urinalysis Basic - ( 28 Mar 2017 10:14 )    Color: Yellow / Appearance: Clear / S.020 / pH: x  Gluc: x / Ketone: Trace mg/dL  / Bili: NEGATIVE / Urobili: 0.2 E.U./dL   Blood: x / Protein: 30 mg/dL / Nitrite: NEGATIVE   Leuk Esterase: Trace / RBC: > 10 /HPF / WBC 5-10 /HPF   Sq Epi: x / Non Sq Epi: Few /HPF / Bacteria: Present /HPF        MEDICATIONS  (STANDING):  sodium chloride 0.9% lock flush 3milliLiter(s) IV Push every 8 hours  insulin lispro (HumaLOG) corrective regimen sliding scale  SubCutaneous Before meals and at bedtime  dextrose 5%. 1000milliLiter(s) IV Continuous <Continuous>  dextrose 50% Injectable 12.5Gram(s) IV Push once  dextrose 50% Injectable 25Gram(s) IV Push once  dextrose 50% Injectable 25Gram(s) IV Push once  pantoprazole    Tablet 40milliGRAM(s) Oral before breakfast  heparin  Injectable 5000Unit(s) SubCutaneous every 8 hours    MEDICATIONS  (PRN):  dextrose Gel 1Dose(s) Oral once PRN Blood Glucose LESS THAN 70 milliGRAM(s)/deciliter  glucagon  Injectable 1milliGRAM(s) IntraMuscular once PRN Glucose LESS THAN 70 milligrams/deciliter        RADIOLOGY & ADDITIONAL TESTS:  EXAM:  ECHOCARDIOGRAM (CARDIOL)                          PROCEDURE DATE:  2017                        INTERPRETATION:  Patient Height: 155.0 cm  Patient Weight: 66.0 kg  Systolic Pressure: 95 mmHg  Diastolic Pressure: 66 mmHg  BSA: 1.7 m^2  Interpretation Summary  Left ventricular hypertrophy present. There is severe global hypokinesis   of the   left ventricle.  The left ventricular ejection fraction is severely   reduced.   The left ventricular ejection fraction is estimated to be 20-25%  The   left   atrium is severely dilated. Right atrial size is normal.The right   ventricle is   normal in size. There is a pacemaker wire in the right heart.  Calcified   aortic valve. Aortic stenosis present; probably low flow low gradient   severe   AS.  The calculated aortic valve area using the continuity equation is   0.5   cm2.The peak pressure gradient is 47 mmHg, mean pressure gradient is 29   mmHg.The dimensionless index (ratio of LVOTvelocity to aortic velocity)   was   calculated to be 0.19.  The calculated stroke volume index is 19 cc/m2   (normal   >35cc/m2). No aortic regurgitation noted.  Mitral annular calcification   noted.   Mitral regurgitation present, at least moderate in severity.  There is   mild to   moderate tricuspid regurgitation. There is trace pulmonic regurgitation.   There is moderate pulmonary hypertension. The pulmonary artery systolic   pressure is estimated to be 50-55 mmHg.  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  No aortic root dilatation.A small pericardial effusion noted.

## 2017-03-29 NOTE — PROGRESS NOTE ADULT - SUBJECTIVE AND OBJECTIVE BOX
met with daughter Zahra again regarding d/c.  Plan per family is to proceed with 2nd opinion as outpt and d/c with traditional home care via [facility].  I spoke with [facility] home hospice liason Brittanie Small and was told delivered oxygen and hospital bed can remain at pt's home and paperwork sent for hospice referral will be forwarded internally to their regular [facility].  Above d/w unit  and .  Plan is for d/c tomorrow.  Daughter confirmed pvt hire for tomorrow

## 2017-03-29 NOTE — DIETITIAN INITIAL EVALUATION ADULT. - ENERGY NEEDS
IBW 43.2Kg  %%  BMI 25.2    Utilized IBW to calculate needs due to wt discrepancies. Fluid per MD 2/2 CHF.

## 2017-03-29 NOTE — PROGRESS NOTE ADULT - ASSESSMENT
89y F with history of HTN, DM2 (diet controlled), acute/chronic systolic and diastolic CHF, recently treated for PNA at OSH and discharged to home after medical therapy.  Pt returned to her cardiologist and underwent echocardiogram which showed critical AS with mean pressure gradient of 44mmHg and BONNIE 0.36cm2.  Admitted on 3/27/17 for further evaluation.  Stable on 3/28/17, echocardiogram revealed BONNIE 0.5cm2 and EF 25% and pt was ruled out as a procedural candidate due to severe risk of morbidity and mortality associated with intervention. Palliative care was consulted for recommendations. 3/29/17, stable.  After extensive family discussion, they have opted to forego hospice care for home care and home PT and wish to seek a second opinion elsewhere.      Of note: Per family request, documentation of pt's hospital stay will be provided via mail and the son was provided with a physical copy of the echocardiogram performed while inpatient.  The family will sign release of information form prior to discharge.     1. Neuro: No delirium  - No acute issues at this time    2. Respiratory: Sat 93% on 2L NC  -Admitted for acute CHF exacerbation  -CXR in AM  -IS and ambulation  -Monitor SpO2 for respiratory status    3. CVS: Intermittently tachycardic, HD stable with borderline hypotension; Critical AS with BONNIE 0.36cm2, EF 25%  -Gentle diuresis with intermittent doses of 20mg PO  -Monitor HR/BP/Tele    4. GI: Stable  -PPX: Protonix  -C/w PO Kosher diet.    5. /Renal: Cr: 1.09 ; No urinary issues  -Trend AM Cr  -Monitor I/O    6. ID: Afebrile, Asymptomatic  -No acute issues at this time, continue to monitor for SIRS    7. Endo: A1C:7.7, hx of DM2, diet controlled  -ISS for glycemic control, well controlled this AM  -Trend FS.     8. Heme: H/H Stable  -DVT PPX: HSQ 5000 q8h  -CBC, chem in AM    Dispo: Pt refusing palliative care.  Home care and home PT arranged. Home tomorrow. 89y F with history of HTN, DM2 (diet controlled), acute/chronic systolic and diastolic CHF, recently treated for PNA at OSH and discharged to home after medical therapy.  Pt returned to her cardiologist and underwent echocardiogram which showed critical AS with mean pressure gradient of 44mmHg and BONNIE 0.36cm2.  Admitted on 3/27/17 for further evaluation.  Stable on 3/28/17, echocardiogram revealed BONNIE 0.5cm2 and EF 25% and pt was ruled out as a procedural candidate due to severe risk of morbidity and mortality associated with intervention. Palliative care was consulted for recommendations. 3/29/17, stable.  After extensive family discussion, they have opted to forego hospice care for home care and home PT and wish to seek a second opinion elsewhere.      Of note: Per family request, documentation of pt's hospital stay will be provided via mail and the son was provided with a physical copy of the echocardiogram performed while inpatient.  The family will sign release of information form prior to discharge.     1. Neuro: No delirium  - No acute issues at this time    2. Respiratory: Sat 93% on 2L NC  -Admitted for acute CHF exacerbation  -CXR in AM  -IS and ambulation  -Monitor SpO2 for respiratory status    3. CVS: Intermittently tachycardic, HD stable with borderline hypotension; Critical AS with BONNIE 0.36cm2, EF 25%  -Gentle diuresis with intermittent doses of 20mg PO  -Monitor HR/BP/Tele  -second opinion at [facility]    4. GI: Stable  -PPX: Protonix  -C/w PO Kosher diet.    5. /Renal: Cr: 1.09 ; No urinary issues  -Trend AM Cr  -Monitor I/O    6. ID: Afebrile, Asymptomatic  -No acute issues at this time, continue to monitor for SIRS    7. Endo: A1C:7.7, hx of DM2, diet controlled  -ISS for glycemic control, well controlled this AM  -Trend FS.     8. Heme: H/H Stable  -DVT PPX: HSQ 5000 q8h  -CBC, chem in AM    Dispo: Pt refusing palliative care. Pt has been accepted to [facility] for consideration for intervention. Plan for transfer today awaiting bed.

## 2017-03-29 NOTE — DISCHARGE NOTE ADULT - CARE PROVIDERS DIRECT ADDRESSES
[email or web address]

## 2017-03-29 NOTE — DISCHARGE NOTE ADULT - MEDICATION SUMMARY - MEDICATIONS TO TAKE
I will START or STAY ON the medications listed below when I get home from the hospital:    furosemide 20 mg oral tablet  -- 1 tab(s) by mouth once a day  -- Indication: For water pill    potassium chloride 10 mEq oral tablet, extended release  -- 1 tab(s) by mouth once a day  -- Indication: For supplement to be taken with water pill    pantoprazole 40 mg oral delayed release tablet  -- 1 tab(s) by mouth once a day (before a meal)  -- Indication: For ACid reflux

## 2017-03-29 NOTE — DISCHARGE NOTE ADULT - HOSPITAL COURSE
Mrs. 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 Wang is a 89y F with history of HTN, DM2 (diet controlled), systolic and diastolic CHF who was recently evaluated at an OSH for PNA and discharged to home after receiving medical therapy.  Pt returned to her cardiologist with c/o worsening SOB and fatigue and underwent echocardiogram which revealed BONNIE 0.36cm2.  Pt was referred to Dr. Patel for further evaluation for possible intervention and admitted to [facility] on 3/27/17 for further evaluation and medical optimization.  On admission, pt was found to be dyspneic, improved with NC, with 3+ pitting edema in b/l LE.  On 3/28/17, pt underwent TTE which confirmed severe AS with BONNIE of 0.5cm2 with moderate to severe MR and EF of 25%.  Upon review of imaging by Dr. Patel, pt was determined not to be a candidate for procedural intervention and the recommendation was made for palliative care to coordinate with the family to establish home hospice care.  At that time the family wished to consider their options and have a family meeting to come to a conclusion.  On 3/29/17, family had still not made a definitive decision regarding hospice care with the concern that should hospice be initiated, she would not receive further care from other medical providers.  Per Dr. Patel, the pt remains a poor candidate for intervention and the recommendation remains for palliation. The family expressed that they would like to seek a second opinion with a physician at [facility] (Dr. Goldberg).  Documentation regarding the pt's hospital stay and echocardiogram CD provided to family prior to discharge if they wish to seek second opinions. Mrs. Wang is a 89y F with history of HTN, DM2 (diet controlled), systolic and diastolic CHF who was recently evaluated at an OSH for PNA and discharged to home after receiving medical therapy.  Pt returned to her cardiologist with c/o worsening SOB and fatigue and underwent echocardiogram which revealed BONNIE 0.36cm2.  Pt was referred to Dr. Patel for further evaluation for possible intervention and admitted to [facility] on 3/27/17 for further evaluation and medical optimization.  On admission, pt was found to be dyspneic, improved with NC, with 3+ pitting edema in b/l LE.  On 3/28/17, pt underwent TTE which confirmed severe AS with BONNIE of 0.5cm2 with moderate to severe MR and EF of 25%.  Upon review of imaging by Dr. Patel, pt was determined not to be a candidate for procedural intervention and the recommendation was made for palliative care to coordinate with the family to establish home hospice care.  At that time the family wished to consider their options and have a family meeting to come to a conclusion.  On 3/29/17, family had still not made a definitive decision regarding hospice care with the concern that should hospice be initiated, she would not receive further care from other medical providers.  Per Dr. Patel, the pt remains a poor candidate for intervention and the recommendation remains for palliation. The family expressed that they would like to seek a second opinion with a physician at [facility] (Dr. White).  Documentation regarding the pt's hospital stay and echocardiogram CD provided to family prior to discharge if they wish to seek second opinions. Patient has been accepted to [facility] under the care of Dr. White. Mrs. Wang is a 89y F with history of HTN, DM2 (diet controlled), systolic and diastolic CHF who was recently evaluated at an OSH for PNA and discharged to home after receiving medical therapy.  Pt returned to her cardiologist with c/o worsening SOB and fatigue and underwent echocardiogram which revealed BONNIE 0.36cm2.  Pt was referred to Dr. Patel for further evaluation for possible intervention and admitted to [facility] on 3/27/17 for further evaluation and medical optimization.  On admission, pt was found to be dyspneic, improved with NC, with 3+ pitting edema in b/l LE.  On 3/28/17, pt underwent TTE which confirmed critical AS (low gradient severe) with moderate to severe MR and EF of 25%.  Upon review of imaging by Dr. Patel, pt was determined not to be a candidate for procedural intervention (risk>benefit given severe reduced EF and heavy degree of calcium burden on valve) and the recommendation was made for palliative care to coordinate with the family to establish home hospice care.  At that time the family wished to consider their options and have a family meeting to come to a conclusion.  On 3/29/17, family had still not made a definitive decision regarding hospice care. Pt remains a poor candidate for intervention and the recommendation remains for palliation. The family obtained second opinion with a physician at [facility] (Dr. White).  Documentation regarding the pt's hospital stay and echocardiogram CD provided to family. Patient has been accepted to [facility] under the care of Dr. White for likely intervention.

## 2017-03-29 NOTE — DIETITIAN INITIAL EVALUATION ADULT. - OTHER INFO
89y/o F admitted with AS and CHF exacerbation. Per provider handoff, pt not an interventional candidate at this time. Per palliative note today, family would like a second CTS opinion and are deciding on possible hospice care. Pt seen resting in bed with family present. They report pt with a fair appetite; consuming ~50% of meals. Pt denies GI distress, pain, and mechanical issues. C/o 1x episode of emesis due to having a lot of mucous after breakfast this morning. PTA family reports pt had a good appetite and was consuming 3x meals/day. She is a diet controlled diabetic and family endorses diet compliance to DM restrictions and low Na for CHF. Family unsure of wt changes; per pt #. Current BW 77kg (3/28) appears to be incorrect; 56.6kg (3/27) appears to be more appropriate wt. Per reported UBW and admission BW; pt with ~20# wt loss, but pt and family unsure of time frame or reason for loss. Informed RN to kindly retake current BW and record. Recommend diet liberalization due to age, decreased po intake, and suspect wt loss. Can add glucerna BID for nutrition optimization prn. Keep nutrition aligned with goals of care. Will follow.

## 2017-03-29 NOTE — PROGRESS NOTE ADULT - SUBJECTIVE AND OBJECTIVE BOX
met face to face with pt's daughter Zahra, son from [facility], and granddaughter-in-law in pt's room with [facility] hospice liason.  Family is requesting a second CTS opinion either from [facility] or outside which they report are working on and a repeat echo.  CTS PA is in the process of getting hold of Dr. Patel to update/discuss further.  Dispo was readdressed in the meantime, including home hospice with pvt hire additional help, inpt hospice, and [facility].  Family is still leaning towards home hospice for d/c while attempting to hire pvt hire.  Listing for pvt hire offered, but family declined.  Daughter confirmed delivery of O2 and hospital bed yesterday. Case d/w unit /.  Pt slept through this entire mtg with family, from [phone]

## 2017-03-29 NOTE — DISCHARGE NOTE ADULT - PATIENT PORTAL LINK FT
“You can access the Follow[facility] Patient Portal, offered by [facility], by registering with the following website: [email or web address]”

## 2017-03-29 NOTE — DISCHARGE NOTE ADULT - PLAN OF CARE
Recover from Hospital Stay -Please follow up with  ___on ___.  The office is located at [facility], 4th floor. Call us with any questions  #[phone].    -Walk daily as tolerated and use your incentive spirometer every hour.    -No driving or strenuous activity/exercise for 6 weeks, or until  cleared by your surgeon.    -Gently clean your incisions with anti-bacterial soap and water, pat  dry.  You may leave them open to air.    -Call your doctor if you have shortness of breath, chest pain not  relieved by pain medication, dizziness, fever >101.5, or increased  redness or drainage from incisions. Patient is being transferred to [facility] for further treatment.

## 2017-03-30 VITALS
RESPIRATION RATE: 17 BRPM | OXYGEN SATURATION: 98 % | HEART RATE: 100 BPM | DIASTOLIC BLOOD PRESSURE: 63 MMHG | SYSTOLIC BLOOD PRESSURE: 125 MMHG

## 2017-03-30 PROCEDURE — 80053 COMPREHEN METABOLIC PANEL: CPT

## 2017-03-30 PROCEDURE — 81003 URINALYSIS AUTO W/O SCOPE: CPT

## 2017-03-30 PROCEDURE — 94150 VITAL CAPACITY TEST: CPT

## 2017-03-30 PROCEDURE — 36415 COLL VENOUS BLD VENIPUNCTURE: CPT

## 2017-03-30 PROCEDURE — 81001 URINALYSIS AUTO W/SCOPE: CPT

## 2017-03-30 PROCEDURE — 83880 ASSAY OF NATRIURETIC PEPTIDE: CPT

## 2017-03-30 PROCEDURE — 84443 ASSAY THYROID STIM HORMONE: CPT

## 2017-03-30 PROCEDURE — 71010: CPT | Mod: 26

## 2017-03-30 PROCEDURE — 86904 BLOOD TYPING PATIENT SERUM: CPT

## 2017-03-30 PROCEDURE — 85025 COMPLETE CBC W/AUTO DIFF WBC: CPT

## 2017-03-30 PROCEDURE — 86901 BLOOD TYPING SEROLOGIC RH(D): CPT

## 2017-03-30 PROCEDURE — 86922 COMPATIBILITY TEST ANTIGLOB: CPT

## 2017-03-30 PROCEDURE — 86900 BLOOD TYPING SEROLOGIC ABO: CPT

## 2017-03-30 PROCEDURE — 93306 TTE W/DOPPLER COMPLETE: CPT

## 2017-03-30 PROCEDURE — 93005 ELECTROCARDIOGRAM TRACING: CPT

## 2017-03-30 PROCEDURE — 85610 PROTHROMBIN TIME: CPT

## 2017-03-30 PROCEDURE — 86921 COMPATIBILITY TEST INCUBATE: CPT

## 2017-03-30 PROCEDURE — 93880 EXTRACRANIAL BILAT STUDY: CPT

## 2017-03-30 PROCEDURE — 83036 HEMOGLOBIN GLYCOSYLATED A1C: CPT

## 2017-03-30 PROCEDURE — 86880 COOMBS TEST DIRECT: CPT

## 2017-03-30 PROCEDURE — 85730 THROMBOPLASTIN TIME PARTIAL: CPT

## 2017-03-30 PROCEDURE — 71045 X-RAY EXAM CHEST 1 VIEW: CPT

## 2017-03-30 PROCEDURE — 86870 RBC ANTIBODY IDENTIFICATION: CPT

## 2017-03-30 PROCEDURE — 86850 RBC ANTIBODY SCREEN: CPT

## 2017-03-30 RX ORDER — POTASSIUM CHLORIDE 20 MEQ
10 PACKET (EA) ORAL DAILY
Qty: 0 | Refills: 0 | Status: DISCONTINUED | OUTPATIENT
Start: 2017-03-30 | End: 2017-03-30

## 2017-03-30 RX ORDER — FUROSEMIDE 40 MG
1 TABLET ORAL
Qty: 0 | Refills: 0 | COMMUNITY
Start: 2017-03-30

## 2017-03-30 RX ORDER — PANTOPRAZOLE SODIUM 20 MG/1
1 TABLET, DELAYED RELEASE ORAL
Qty: 0 | Refills: 0 | COMMUNITY
Start: 2017-03-30

## 2017-03-30 RX ORDER — FUROSEMIDE 40 MG
20 TABLET ORAL DAILY
Qty: 0 | Refills: 0 | Status: DISCONTINUED | OUTPATIENT
Start: 2017-03-30 | End: 2017-03-30

## 2017-03-30 RX ORDER — POTASSIUM CHLORIDE 20 MEQ
1 PACKET (EA) ORAL
Qty: 0 | Refills: 0 | COMMUNITY
Start: 2017-03-30

## 2017-03-30 RX ADMIN — Medication: at 16:30

## 2017-03-30 RX ADMIN — PANTOPRAZOLE SODIUM 40 MILLIGRAM(S): 20 TABLET, DELAYED RELEASE ORAL at 06:21

## 2017-03-30 RX ADMIN — SODIUM CHLORIDE 3 MILLILITER(S): 9 INJECTION INTRAMUSCULAR; INTRAVENOUS; SUBCUTANEOUS at 06:06

## 2017-03-30 RX ADMIN — Medication 10 MILLIEQUIVALENT(S): at 11:47

## 2017-03-30 RX ADMIN — SODIUM CHLORIDE 3 MILLILITER(S): 9 INJECTION INTRAMUSCULAR; INTRAVENOUS; SUBCUTANEOUS at 14:30

## 2017-03-30 RX ADMIN — Medication 20 MILLIGRAM(S): at 09:53

## 2017-03-30 RX ADMIN — HEPARIN SODIUM 5000 UNIT(S): 5000 INJECTION INTRAVENOUS; SUBCUTANEOUS at 06:21

## 2017-03-30 RX ADMIN — HEPARIN SODIUM 5000 UNIT(S): 5000 INJECTION INTRAVENOUS; SUBCUTANEOUS at 14:30

## 2017-03-30 NOTE — PROGRESS NOTE ADULT - SUBJECTIVE AND OBJECTIVE BOX
informed by [facility] liason that HIP authorization for [facility] may take up to a wk, arrived to unit to hear unit /case management on phone with liason as well with daughter present.  Plan is to set up home care from [facility] while Dr. Patel discusses with CTS surgeon at [facility] for possible transfer from [facility] vs. outpt consult.  Pvt hire set up by family is on standby. Signing off.  Reconsult PRN.  Dispo deferred to

## 2017-03-30 NOTE — PROGRESS NOTE ADULT - SUBJECTIVE AND OBJECTIVE BOX
Patient discussed on morning rounds with Dr. Patel    Operation / Date: N/a     Surgeon: Dr. Patel    SUBJECTIVE ASSESSMENT:  89y Female feels okay today. Still reports SOB at rest. Denies chest pain, fever, chills, n/v/d. Patient and patient's family seeking second opinion about BAV procedure. Requesting transfer to [facility] for further evaluation.     Hospital Course:  Mrs. Wang is a 89y F with history of HTN, DM2 (diet controlled), systolic and diastolic CHF who was recently evaluated at an OSH for PNA and discharged to home after receiving medical therapy.  Pt returned to her cardiologist with c/o worsening SOB and fatigue and underwent echocardiogram which revealed BONNIE 0.36cm2.  Pt was referred to Dr. Patel for further evaluation for possible intervention and admitted to [facility] on 3/27/17 for further evaluation and medical optimization.  On admission, pt was found to be dyspneic, improved with NC, with 3+ pitting edema in b/l LE.  On 3/28/17, pt underwent TTE which confirmed severe AS with BONNIE of 0.5cm2 with moderate to severe MR and EF of 25%.  Upon review of imaging by Dr. Patel, pt was determined not to be a candidate for procedural intervention and the recommendation was made for palliative care to coordinate with the family to establish home hospice care.  At that time the family wished to consider their options and have a family meeting to come to a conclusion.  On 3/29/17, family had still not made a definitive decision regarding hospice care with the concern that should hospice be initiated, she would not receive further care from other medical providers.  Per Dr. Patel, the pt remains a poor candidate for intervention and the recommendation remains for palliation. The family expressed that they would like to seek a second opinion with a physician at [facility] (Dr. White).  Documentation regarding the pt's hospital stay and echocardiogram CD provided to family prior to discharge if they wish to seek second opinions. Patient has been accepted to [facility] under the care of Dr. White.      Vital Signs Last 24 Hrs  T(C): 36.4, Max: 36.9 (03-30 @ 00:40)  T(F): 97.5, Max: 98.4 (03-30 @ 00:40)  HR: 74 (70 - 98)  BP: 123/64 (107/63 - 132/89)  BP(mean): 89 (78 - 105)  RR: 16 (16 - 18)  SpO2: 96% (96% - 97%)  I&O's Detail  I & Os for 24h ending 30 Mar 2017 07:00  =============================================  IN:    Oral Fluid: 100 ml    Total IN: 100 ml  ---------------------------------------------  OUT:    Total OUT: 0 ml  ---------------------------------------------  Total NET: 100 ml    I & Os for current day (as of 30 Mar 2017 18:32)  =============================================  IN:    Oral Fluid: 500 ml    Total IN: 500 ml  ---------------------------------------------  OUT:    Total OUT: 0 ml  ---------------------------------------------  Total NET: 500 ml        PHYSICAL EXAM:      General: Resting comfortably in bed, no acute distress.     Neurological: AAOx3, no AMS or focal deficits.     Cardiovascular: +II/VI DAXA at RUSB, S1/S2, tachycardic.     Respiratory: +Diffuse rales, no acute distress, no wheeze or rhonchi    Gastrointestinal: ND, NBS, non-TTP.    Extremities: 3+ pitting edema in b/l LE    Vascular: Pulses 2+ in b/l R; unable to palpate DP, PT 2/2 edema.     Incision Sites: NA    LABS:      COUMADIN:  No.                       MEDICATIONS  (STANDING):  sodium chloride 0.9% lock flush 3milliLiter(s) IV Push every 8 hours  insulin lispro (HumaLOG) corrective regimen sliding scale  SubCutaneous Before meals and at bedtime  dextrose 5%. 1000milliLiter(s) IV Continuous <Continuous>  dextrose 50% Injectable 12.5Gram(s) IV Push once  dextrose 50% Injectable 25Gram(s) IV Push once  dextrose 50% Injectable 25Gram(s) IV Push once  pantoprazole    Tablet 40milliGRAM(s) Oral before breakfast  heparin  Injectable 5000Unit(s) SubCutaneous every 8 hours  furosemide    Tablet 20milliGRAM(s) Oral daily  potassium chloride    Tablet ER 10milliEquivalent(s) Oral daily      Discharge CXR:   INTERPRETATION:  XR CHEST 1 VIEW PORT IMMEDIATE DATED 3/30/2017 9:26 AM    INDICATION: 89 years old Female with follow up.    PRIOR STUDIES: Chest x-ray from 3/28/2017    FINDINGS: Single frontal view the chest is submitted. There is a left   chest wall pacemaker with leads in the right atrium and right ventricle.   Small right pleural effusion is unchanged. Slight interval increase in   small left pleural effusion. Bibasilar infiltrates may represent   atelectasis however, developing pneumonia can't be excluded. Increased   perihilar infiltrates. No pneumothorax. The heart is enlarged.   Degenerative changes are seen in the spine.    IMPRESSION: Slightly increased congestion.      Discharge ECHO:   INTERPRETATION:  Patient Height: 155.0 cm  Patient Weight: 66.0 kg  Systolic Pressure: 95 mmHg  Diastolic Pressure: 66 mmHg  BSA: 1.7 m^2  Interpretation Summary  Left ventricular hypertrophy present. There is severe global hypokinesis   of the   left ventricle.  The left ventricular ejection fraction is severely   reduced.   The left ventricular ejection fraction is estimated to be 20-25%  The   left   atrium is severely dilated. Right atrial size is normal.The right   ventricle is   normal in size. There is a pacemaker wire in the right heart.  Calcified   aortic valve. Aortic stenosis present; probably low flow low gradient   severe   AS.  The calculated aortic valve area using the continuity equation is   0.5   cm2.The peak pressure gradient is 47 mmHg, mean pressure gradient is 29   mmHg.The dimensionless index (ratio of LVOTvelocity to aortic velocity)   was   calculated to be 0.19.  The calculated stroke volume index is 19 cc/m2   (normal   >35cc/m2). No aortic regurgitation noted.  Mitral annular calcification   noted.   Mitral regurgitation present, at least moderate in severity.  There is   mild to   moderate tricuspid regurgitation. There is trace pulmonic regurgitation.   There is moderate pulmonary hypertension. The pulmonary artery systolic   pressure is estimated to be 50-55 mmHg.  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  No aortic root dilatation.A small pericardial effusion noted.

## 2017-04-03 DIAGNOSIS — I08.0 RHEUMATIC DISORDERS OF BOTH MITRAL AND AORTIC VALVES: ICD-10-CM

## 2017-04-03 DIAGNOSIS — I50.43 ACUTE ON CHRONIC COMBINED SYSTOLIC (CONGESTIVE) AND DIASTOLIC (CONGESTIVE) HEART FAILURE: ICD-10-CM

## 2017-04-03 DIAGNOSIS — I11.0 HYPERTENSIVE HEART DISEASE WITH HEART FAILURE: ICD-10-CM

## 2017-04-03 DIAGNOSIS — Z95.0 PRESENCE OF CARDIAC PACEMAKER: ICD-10-CM

## 2017-04-03 DIAGNOSIS — Z85.3 PERSONAL HISTORY OF MALIGNANT NEOPLASM OF BREAST: ICD-10-CM

## 2017-04-03 DIAGNOSIS — Z53.8 PROCEDURE AND TREATMENT NOT CARRIED OUT FOR OTHER REASONS: ICD-10-CM

## 2017-04-03 DIAGNOSIS — E11.9 TYPE 2 DIABETES MELLITUS WITHOUT COMPLICATIONS: ICD-10-CM

## 2017-11-03 NOTE — DISCHARGE NOTE ADULT - CARE PROVIDER_API CALL
FUTURE APPOINTMENTS    Follow up for a 40 minute wide excision for dysplastic nevus on the left shoulder.    Follow up every 1 year(s) for a full-body skin cancer screening.    Follow up per pathology report.    TOPICAL MEDICATION INSTRUCTIONS  Ketoconazole 2% cream    Apply a thin layer to the affected area(s) on the face and ears two times per day. Continue to use as needed to control symptoms    This is not a steroid, and it can be used on the face.    Keep in mind to also regularly use moisturizer, as this preventative measure can help maintain your skin's natural moisture barrier.    Apply moisturizer after application of medication.    SHAMPOO INSTRUCTIONS  Continue alternating use of OTC anti-dandruff shampoos with different active ingredients every 4 week(s).   Coal Tar shampoos : Neutrogena T/Gel, Denorex, DHS Tar, Ionil-T, Tegrin, X-Seb T, Zetar  Zinc Pyrithione shampoos : Head & Shoulders, Denorex Advance Formula, DHS Zinc, Zincon  Salicylic Acid shampoos : Neutrogena T/Sal, DHS Sal, Ionil, P&S, Sebulex, X-Seb  Selenium Sulfide shampoos : Selsun Blue shampoo  Sulfur shampoos : Sebulex    WOUND CARE INSTRUCTIONS  1. After 24 hours, change dressing daily.  2. Wash hands before every dressing change.  3. Wash the wound area with a mild soap, then rinse  4. Gently pat dry with a sterile gauze or Q-tip.  5. Apply Vaseline or Aquaphor only over entire wound. Do NOT use Neosporin - as many people react to neomycin.  6. Finally, cover with a bandage or sterile non-stick gauze with micropore paper tape.  7. Repeat once daily until wound has healed.    Do not let the wound dry out.  The wound will heal faster and with better cosmetic results if routinely kept moist with step #5. It is a false belief that a wound heals better when it is exposed to air and allowed to dry out.      Soap, water and shampoo will not hurt this area.    Do not go swimming or take baths, but showering is encouraged.    Limit use of the  "area where the procedure was done for a few days to allow for optimal healing.    If you experience bleeding:  Repeat steps #2-5 and hold firm pressure on the area for 10 minutes without checking to see if the bleeding has stopped. \"Checking\" pulls off the protective wound clot and restarts the bleeding all over again. Re-apply pressure for 10 minutes if necessary to stop bleeding.  Use additional sterile gauze and tape to maintain pressure once bleeding has stopped.    Signs of Infection:  Infection can occur in any area where skin has been disrupted.  If you notice persistent redness, swelling, colored drainage, increasing pain, fever or other signs of infection, please call us at: [phone] and ask to have me or my colleague paged. We will call you back to discuss.    Pathology Results:  You will be notified, generally via letter or MyChart, in approximately 10 days. If there is anything we need to discuss or further treatment needed, I will call you to discuss it.    Skin tissue can be some of the most challenging tissue for pathologists to examine, therefore we may use a specialist outside the [facility] system to read certain submitted tissue samples. When submitted to these outside specialists, [facility] will notify you that your tissue sample result has returned. However, this only means that the tissue sample has been sent to the specialist. These results are not available in [facility], so I will contact you when I receive the final report.    PATIENT INFORMATION : WOUNDS  During the healing process you will notice a number of changes. All wounds develop a small halo of redness surrounding the wound.  This means healing is occurring. Severe itching with extensive redness usually indicates sensitivity to the ointment or bandage tape used to dress the wound.  You should call our office if this develops.      Swelling  and/or discoloration around your surgical site is common, particularly when performed around " the eye.    All wounds normally drain.  The larger the wound the more drainage there will be.  After 7-10 days, you will notice the wound beginning to shrink and new skin will begin to grow.  The wound is healed when you can see skin has formed over the entire area.  A healed wound has a healthy, shiny look to the surface and is red to dark pink in color to normalize.  Wounds may take approximately 4-6 weeks to heal.  Larger wounds may take 6-8 weeks. After the wound is healed you may discontinue dressing changes.    You may experience a sensation of tightness as your wound heals. This is normal and will gradually subside.    Your healed wound may be sensitive to temperature changes. This sensitivity improves with time, but if you re having a lot of discomfort, try to avoid temperature extremes.    Patients frequently experience itching after their wound appears to have healed because of the continue healing under the skin.  Plain Vaseline will help relieve the itching.    SUN PROTECTION INSTRUCTIONS  Sun damage can lead to skin cancer and premature aging of the skin.      The best way to protect from sun damage to your skin is to avoid the sun during peak hours (10 am - 2 pm) even on overcast days.      Use UPF sun-protective clothing, which while more expensive initially provides longer lasting coverage without having to worry about remembering to re-apply.  1. Wear a wide-brimmed hat and sunglasses.   2. Wear sun-protective clothing.  [employer] and other [employer] make sun protective clothing that are stylish, comfortable and cool. [employer] and other [employer] make UV arm sleeves suitable for golfing, gardening and other activities.      Sunscreen instructions:  1. Use sunscreens with Zinc Oxide, Titanium Dioxide or Avobenzone to protect from UVA rays.  2. Use SPF 30-50+ to protect from UVB rays.  3. Re-apply every 2 hours even if water resistant.  4. Apply on your face every day even when cloudy  "and even in the winter. UVA \"aging rays\" penetrate window glass and are just as strong in the winter as in the summer.    Product Recommendations:    Good examples include: Blue Vicenta, EltaMD, Solbar    Good daily moisturizers with SPF: Vanicream, CeraVe.    For sensitive skin, consider : Skin[employer]ica Essential Defense Mineral Shield Broad Spectrum SPF 35      Never use tanning beds. Tanning beds are associated with much higher risks of skin cancer.    All tanning damages the skin. Aim for ivory skin year round and you will have less trouble with your skin in years to come. There is no merit in getting \"a base tan\" before a warm weather vacation, as any tanning indicates your body's response to sun damage.    Stop smoking. Smokers have higher rates of skin cancer and also have premature skin wrinkling.    FYI  You should use about 3 tablespoons of sunscreen to protect your whole body. Thus a typical eight ounce bottle of sunscreen should last 4 applications. Remember, that the SPF rating is compromised if you don t apply enough. Most people only apply 1/2 - 1/3 of the amount they need. Also don t forget areas such as your ears, feet, upper back and harder to reach places. Keep in mind that these amounts should be increased for larger body sizes.    Sunscreens with titanium dioxide and/or zinc oxide in the active ingredients are physical blockers as opposed to chemical blockers. Chemical-free sunscreens should not irritate the skin.    Spray-on sunscreens may be used for touch-up application only, not as a base layer. Also, use with caution around small children due to inhalation risk.    Avoid retinyl palmitate products.    Avoid combination products that include both sunscreen and insect repellant, as sunscreen should be applied every 2 hours, but insect repellant should not be applied as frequently.    SPF means sun protection factor, which is just the degree to which the sunscreen can protect against UVB rays. " There is no rating system for UVA rays. SPF is calculated as the time skin will burn when sunscreen is applied vs. skin without sunscreen.    Water resistant sunscreens should be re-applied every 1-2 hours.    For more information:  http://www.skincancer.org/prevention/sun-protection/sunscreen/sunscreens-safe-and-effective   Mahad Patel), Cardiology; Internal Medicine; Interventional Cardiology  [address] [place] [address]  Phone: [phone]  Fax: [phone]